# Patient Record
Sex: MALE | Race: WHITE | HISPANIC OR LATINO | Employment: FULL TIME | URBAN - METROPOLITAN AREA
[De-identification: names, ages, dates, MRNs, and addresses within clinical notes are randomized per-mention and may not be internally consistent; named-entity substitution may affect disease eponyms.]

---

## 2023-10-06 ENCOUNTER — OFFICE VISIT (OUTPATIENT)
Dept: FAMILY MEDICINE CLINIC | Facility: CLINIC | Age: 38
End: 2023-10-06

## 2023-10-06 VITALS
TEMPERATURE: 98.3 F | OXYGEN SATURATION: 98 % | HEART RATE: 74 BPM | DIASTOLIC BLOOD PRESSURE: 80 MMHG | BODY MASS INDEX: 31.49 KG/M2 | HEIGHT: 65 IN | WEIGHT: 189 LBS | RESPIRATION RATE: 18 BRPM | SYSTOLIC BLOOD PRESSURE: 112 MMHG

## 2023-10-06 DIAGNOSIS — R20.2 PARESTHESIA OF BOTH HANDS: Primary | ICD-10-CM

## 2023-10-06 DIAGNOSIS — Z78.9 ALCOHOL USE: ICD-10-CM

## 2023-10-06 PROBLEM — R20.0 BILATERAL FINGER NUMBNESS: Status: ACTIVE | Noted: 2023-10-06

## 2023-10-06 NOTE — PROGRESS NOTES
Name: Deforest Koyanagi      : 1985      MRN: [id number]  Encounter Provider: Allison Becerra MD  Encounter Date: 10/6/2023   Encounter department: [facility],[address]     1. Paresthesia of both hands  Assessment & Plan:  Patient reports feeling bilateral numbness and heavy sensation of all fingers that is intermittent in nature for the past 4 months  Of note patient works in [employer] and reports having a history of drinking apx 20 beers every weekend and getting drunk. Patient states he does not drink to that extent anymore but enjoys drinking apx 6 beers on weekends     DDX Vitamin deficiency induced by malnutrition due to history of alcohol consumption vs neuropathy vs. Carpal tunnel syndrome vs. hypothyroididm  Negative Tinel and Phalen sign on physical exam     -vitamin d  -folate, thiamine  -tsh  -cbc    Orders:  -     TSH, 3rd generation with Free T4 reflex; Future  -     CBC and differential; Future  -     Vitamin B12; Future  -     Folate; Future  -     Vitamin D 25 hydroxy; Future    2. Alcohol use  Assessment & Plan:  Patient reports in his 25s and early 35s he used to drink apx 20 beers every weekend and would get drunk. For the past 5 years patient reports he drink apx 6 beers on weekends    Orders:  -     CBC and differential; Future  -     Vitamin B12; Future  -     Folate; Future         Subjective      35 yo male with no significant past medical history comes to clinic today to establish care as a new Patient. Patient reports that apx 4 months ago he noticed that all fingers in bl arms started feeling heavy, numb and in ocassions they swell. Sometimes numbness and heaviness radies to the patient's right arm. He reports he has been a  for the past 15 years.   Patient denies low back pain, lower extremity pain, fever, chills, recent travel or exposure to sick contacts    Review of Systems   Constitutional: Negative for chills and fever. HENT: Negative for ear pain and sore throat. Eyes: Negative for pain and visual disturbance. Respiratory: Negative for cough and shortness of breath. Cardiovascular: Negative for chest pain and palpitations. Gastrointestinal: Negative for abdominal pain and vomiting. Genitourinary: Negative for dysuria and hematuria. Musculoskeletal: Positive for joint swelling. Negative for arthralgias and back pain. Bilateral swelling of finger joints   Skin: Negative for color change and rash. Neurological: Negative for seizures and syncope. All other systems reviewed and are negative. No current outpatient medications on file prior to visit. Objective     /80 (BP Location: Left arm, Patient Position: Sitting, Cuff Size: Standard)   Pulse 74   Temp 98.3 °F (36.8 °C) (Temporal)   Resp 18   Ht 5' 5" (1.651 m)   Wt 85.7 kg (189 lb)   SpO2 98%   BMI 31.45 kg/m²     Physical Exam  Vitals reviewed. HENT:      Right Ear: Tympanic membrane normal.      Nose: Nose normal.      Mouth/Throat:      Pharynx: Oropharynx is clear. Cardiovascular:      Rate and Rhythm: Normal rate and regular rhythm. Pulses: Normal pulses. Heart sounds: Normal heart sounds. Pulmonary:      Effort: Pulmonary effort is normal.   Musculoskeletal:         General: Normal range of motion. Right shoulder: Tenderness present. Decreased strength. Left shoulder: Normal.      Right hand: No swelling, tenderness or bony tenderness. Normal strength. Normal sensation. Normal capillary refill. Normal pulse. Left hand: No swelling, tenderness or bony tenderness. Normal range of motion. Normal strength. Normal sensation. Normal capillary refill. Normal pulse. Comments: phalen and tinel sign negative    Skin:     General: Skin is warm and dry. Capillary Refill: Capillary refill takes less than 2 seconds. Neurological:      General: No focal deficit present.       Mental Status: He is alert.        Be Terry MD

## 2023-10-06 NOTE — ASSESSMENT & PLAN NOTE
Patient reports feeling bilateral numbness and heavy sensation of all fingers that is intermittent in nature for the past 4 months  Of note patient works in [employer] and reports having a history of drinking apx 20 beers every weekend and getting drunk.  Patient states he does not drink to that extent anymore but enjoys drinking apx 6 beers on weekends     DDX Vitamin deficiency induced by malnutrition due to history of alcohol consumption vs neuropathy vs. Carpal tunnel syndrome vs. hypothyroididm  Negative Tinel and Phalen sign on physical exam     -vitamin d  -folate, thiamine  -tsh  -cbc

## 2023-10-06 NOTE — ASSESSMENT & PLAN NOTE
Patient reports in his 25s and early 35s he used to drink apx 20 beers every weekend and would get drunk.   For the past 5 years patient reports he drink apx 6 beers on weekends 27-Sep-2017